# Patient Record
Sex: FEMALE | Race: BLACK OR AFRICAN AMERICAN | NOT HISPANIC OR LATINO | URBAN - METROPOLITAN AREA
[De-identification: names, ages, dates, MRNs, and addresses within clinical notes are randomized per-mention and may not be internally consistent; named-entity substitution may affect disease eponyms.]

---

## 2022-02-13 ENCOUNTER — EMERGENCY (EMERGENCY)
Facility: HOSPITAL | Age: 57
LOS: 0 days | Discharge: HOME | End: 2022-02-13
Attending: STUDENT IN AN ORGANIZED HEALTH CARE EDUCATION/TRAINING PROGRAM
Payer: COMMERCIAL

## 2022-02-13 VITALS
HEIGHT: 70 IN | WEIGHT: 149.91 LBS | OXYGEN SATURATION: 100 % | HEART RATE: 105 BPM | RESPIRATION RATE: 18 BRPM | TEMPERATURE: 97 F | DIASTOLIC BLOOD PRESSURE: 82 MMHG | SYSTOLIC BLOOD PRESSURE: 133 MMHG
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
## 2022-02-13 DIAGNOSIS — S61.411A LACERATION WITHOUT FOREIGN BODY OF RIGHT HAND, INITIAL ENCOUNTER: ICD-10-CM

## 2022-02-13 PROCEDURE — 12002 RPR S/N/AX/GEN/TRNK2.6-7.5CM: CPT

## 2022-02-13 PROCEDURE — 73130 X-RAY EXAM OF HAND: CPT | Mod: 26,RT

## 2022-02-13 PROCEDURE — 99283 EMERGENCY DEPT VISIT LOW MDM: CPT | Mod: 25

## 2022-02-13 RX ADMIN — Medication 1 TABLET(S): at 13:33

## 2022-02-13 NOTE — ED PROVIDER NOTE - SKIN, MLM
3.5 cm laceration to dorsal aspect of right 4th ring finger. .5 cm abrasion to distal aspect of right 4th digit. Skin abrasion to distal aspect of right 3rd digit. 3.5 cm laceration to dorsal aspect of right 4th ring finger, 0.5 cm laceration to distal aspect of right 4th digit. Skin abrasion to distal aspect of right 3rd digit and 4th digit

## 2022-02-13 NOTE — ED PROVIDER NOTE - PATIENT PORTAL LINK FT
You can access the FollowMyHealth Patient Portal offered by [facility] by registering at the following website: [email or web address]. By joining [facility]’s FollowMyHealth portal, you will also be able to view your health information using other applications (apps) compatible with our system.

## 2022-02-13 NOTE — ED ADULT TRIAGE NOTE - CHIEF COMPLAINT QUOTE
Pt pw laceration R hand, and 2nd/3rd fingers, pt stated, while closing window, glass broke and fell onto hand. bleeding controlled in triage, denies any blood thinners.

## 2022-02-13 NOTE — ED PROCEDURE NOTE - PROCEDURE ADDITIONAL DETAILS
Laceration #2:   location: right fourth digit, dorsal aspect, distal finger  3 sutures placed, interrupted fashion, no foreign body, minimal blood loss, pt tolerated the procedure well

## 2022-02-13 NOTE — ED PROVIDER NOTE - CLINICAL SUMMARY MEDICAL DECISION MAKING FREE TEXT BOX
pt presented with laceration to her right hand due to broken glass, xray is negative for foreign body, laceration repair done my me, steristrips and bandaide applied to the abrasions, pt refused tetanus, augmentin prescribed, pt told to return in one week for wound check and suture removal, told to return for signs of infection - redness, worsening pain, drainage, or increased swelling, home care instructions provided

## 2022-02-13 NOTE — ED PROVIDER NOTE - OBJECTIVE STATEMENT
56 year old female w/ no PMH, presents to the ED for laceration repair. Pt has a laceration to the right hand. Pt opened a window and the glass broke and fell onto the right hand. Pt with pain due to laceration. Last tetanus unknown, refused update. Normal ROM however decreased due to pain. Normal pulses and sensation. 56 year old female w/ no PMH, presents to the ED for laceration repair. Pt presents with  a laceration to the right hand. Pt opened a window when the glass broke and fell onto the right hand. Pt denies pain, Last tetanus unknown, refused tetanus today, Normal ROM of the right hand, however, decreased due to pain. Normal pulses and sensation.

## 2022-02-13 NOTE — ED ADULT NURSE NOTE - OBJECTIVE STATEMENT
PT PRESENTED TO ER, AOX4 AND AMBULATORY, WITH COMPLAINTS OF LACERATION TO RIGHT HAND. AS PER PT, A GLASS WINDOW FELL ONTO HAND CAUSING LACERATION TO TOP OF HAND AND THIRD AND FOURTH DIGIT. PT DENIES ANY PAIN, DIZZINESS, NUMBNESS/TINGLING IN EXTREMITY. ON ASSESSMENT, PT HAS VISIBLE SWELLING AND BLEEDING AT SITE.

## 2022-02-13 NOTE — ED PROVIDER NOTE - CARE PLAN
1 Principal Discharge DX:	Laceration of hand, initial encounter  Secondary Diagnosis:	Abrasion of finger

## 2022-02-19 ENCOUNTER — EMERGENCY (EMERGENCY)
Facility: HOSPITAL | Age: 57
LOS: 0 days | Discharge: HOME | End: 2022-02-19
Payer: COMMERCIAL

## 2022-02-19 VITALS
HEART RATE: 90 BPM | TEMPERATURE: 99 F | SYSTOLIC BLOOD PRESSURE: 141 MMHG | OXYGEN SATURATION: 99 % | DIASTOLIC BLOOD PRESSURE: 79 MMHG | WEIGHT: 149.91 LBS | RESPIRATION RATE: 16 BRPM | HEIGHT: 70 IN
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
## 2022-02-19 DIAGNOSIS — Z48.02 ENCOUNTER FOR REMOVAL OF SUTURES: ICD-10-CM

## 2022-02-19 DIAGNOSIS — S61.411D LACERATION WITHOUT FOREIGN BODY OF RIGHT HAND, SUBSEQUENT ENCOUNTER: ICD-10-CM
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
## 2022-02-19 DIAGNOSIS — S61.214D LACERATION WITHOUT FOREIGN BODY OF RIGHT RING FINGER WITHOUT DAMAGE TO NAIL, SUBSEQUENT ENCOUNTER: ICD-10-CM

## 2022-02-19 PROCEDURE — L9995: CPT

## 2022-02-19 NOTE — ED PROVIDER NOTE - PHYSICAL EXAMINATION
PE:   GEN: Awake, alert, interactive, NAD, non-toxic appearing.   HEAD AND NECK: NC/AT. Airway patent. Neck supple.   EYES: Clear b/l. PERRL  CARDIAC: RRR. S1, S2. No evident pedal edema.    RESP: Normal respiratory effort with no use of accessory muscles or retractions. Clear throughout on auscultation.  ABD: soft, non-distended, non-tender. No rebound, no guarding.   NEURO: AOx3, CN II-XII grossly intact, no focal deficits.   MSK: Moving all extremities with no apparent deformities.   SKIN: 5cm linear laceration over the R metacarpals closed with nylon retention sutures, not healed. No discharge or redness. Non tender.

## 2022-02-19 NOTE — ED PROVIDER NOTE - NSFOLLOWUPINSTRUCTIONS_ED_ALL_ED_FT

## 2022-02-19 NOTE — ED PROVIDER NOTE - DISCHARGE DATE
19-Feb-2022 Topical Sulfur Applications Pregnancy And Lactation Text: This medication is Pregnancy Category C and has an unknown safety profile during pregnancy. It is unknown if this topical medication is excreted in breast milk.

## 2022-02-19 NOTE — ED ADULT NURSE NOTE - OBJECTIVE STATEMENT
pt is 57 y/o female c/c of suture removal on dorsal part of R hand and 4th finger. sutures placed 2/13/22.

## 2022-02-19 NOTE — ED PROVIDER NOTE - CLINICAL SUMMARY MEDICAL DECISION MAKING FREE TEXT BOX
55yo female presents for suture removal for lac repair on 2/13. Wounds not well healed require more time for healing. Discussed wound care with patient and recommended return in 5 more days.

## 2022-02-19 NOTE — ED PROVIDER NOTE - NS ED ROS FT
Constitutional: (-) Fever, (-) Anorexia, (-) Generalized Malaise  Eyes: (-)Discharge, (-) Irritation,  (-) Visual changes  EARS: (-) Ear Pain, (-) Apparent hearing changes  NOSE: (-) Congestion, (-) Bloody nose  MOUTH/THROAT: (-) Vocal Changes, (-) Drooling, (-) Sore throat  NECK: (-) Lumps, (-) Stiffness, (-) Pain  CV: (-) Chest Pain, (-) Palpitations, (-) Edema   RESP:  (-) Cough, (-) SOB, (-) NUNEZ,  (-) Wheezing  GI: (-) Nausea, (-) Vomiting, (-) Abdominal Pain, (-) Diarrhea, (-) Constipation, (-) Bloody stools  : (-) Dysuria, (-) Frequency, (-) Hematuria, (-) Incontinence  MSK: (-) Joint Pain, (-) Back Pain, (-) Deformities  SKIN: (+) Wounds, (-) Color change, (-)Rash, (-) Swelling  NEURO:(-) Headache, (-) Dizziness, (-) Numbness/Tingling,  (-)LOC

## 2022-02-19 NOTE — ED PROVIDER NOTE - OBJECTIVE STATEMENT
57yo female presents for suture removal for lac repair on 2/13. Pt had 5cm laceration over her metacarpals of the R hand and 1 cm lac of the 4th digit of the R hand. Denies fevers/chills, discharge, increased pain and other associated sx.

## 2022-02-20 PROBLEM — Z78.9 OTHER SPECIFIED HEALTH STATUS: Chronic | Status: ACTIVE | Noted: 2022-02-13
